# Patient Record
Sex: FEMALE | Race: BLACK OR AFRICAN AMERICAN | NOT HISPANIC OR LATINO | URBAN - METROPOLITAN AREA
[De-identification: names, ages, dates, MRNs, and addresses within clinical notes are randomized per-mention and may not be internally consistent; named-entity substitution may affect disease eponyms.]

---

## 2023-08-11 DIAGNOSIS — Z80.3 FAMILY HISTORY OF MALIGNANT NEOPLASM OF BREAST: ICD-10-CM

## 2023-08-11 DIAGNOSIS — N63.20 MASS OF LEFT BREAST, UNSPECIFIED QUADRANT: Primary | ICD-10-CM

## 2023-09-20 DIAGNOSIS — N63.20 MASS OF LEFT BREAST, UNSPECIFIED QUADRANT: Primary | ICD-10-CM

## 2023-09-20 PROBLEM — N60.12 DIFFUSE CYSTIC MASTOPATHY OF BOTH BREASTS: Status: ACTIVE | Noted: 2023-09-20

## 2023-09-20 PROBLEM — N60.11 DIFFUSE CYSTIC MASTOPATHY OF BOTH BREASTS: Status: ACTIVE | Noted: 2023-09-20

## 2023-09-20 PROBLEM — N63.10 MASS OF MULTIPLE SITES OF RIGHT BREAST: Status: ACTIVE | Noted: 2023-09-20

## 2023-09-20 NOTE — ASSESSMENT & PLAN NOTE
We discussed her family history and how it could impact her own future risks.  We discussed family vs. genetic history and the importance and implications of each. All questions answered to her satisfaction.  She knows that as additional family members are diagnosed - she will need to let us know as this may change follow up and imaging recommendations.    We had a discussion concerning Breast Cancer Risk Reduction and current NCCN Guidelines. She knows that her risk can be lowered slightly with a healthy lifestyle and minimal ETOH use. Being physically active will also help. She should reduce or stay away from OCPs and HRT as possible.

## 2023-09-20 NOTE — PROGRESS NOTES
[facility]  Shukri Wood MD, FACS  Raymundo Norman NP-C      Date of Service: 9/28/2023    Chief Complaint:   Belle Suarez is a 53 y.o. female presenting today for her 6 month evaluation. She is due for her annual mammogram.  She reports no interval changes.     History of Present Illness:   Mrs. Mccormack first presented on February 23, 2017 due to a left breast mass and sonocore biopsy revealed Fibroadenomatoid breast parenchyma with nodular adenosis. She has a worrisome family history of breast cancer. Her TAL was calculated in 2019 and found to give her a 26.9% Lifetime Risk of Breast Cancer. She was lost to follow up after her biopsy in 2017. She presented back September 30, 2019 to re-establish care. In June 2021 she was noted with a benign appearing complicated cysts in the right breast and close follow up proved stability. MD::: Radha Fine MD.      Past Medical History:   Diagnosis Date    Breast pain     Diffuse cystic mastopathy of both breasts 9/20/2023    Family history of malignant neoplasm of breast     Left breast lump     Mass of multiple sites of right breast 9/20/2023      Past Surgical History:   Procedure Laterality Date    HYSTERECTOMY      Left breast sonocore biopsy          Current Outpatient Medications:     cyclobenzaprine (FLEXERIL) 5 MG tablet, Take 1 tablet by mouth as needed., Disp: , Rfl:     diltiaZEM (CARDIZEM CD) 240 MG 24 hr capsule, Take 240 mg by mouth., Disp: , Rfl:     lamoTRIgine (LAMICTAL) 200 MG tablet, Take 200 mg by mouth., Disp: , Rfl:     losartan-hydrochlorothiazide 100-25 mg (HYZAAR) 100-25 mg per tablet, Take 1 tablet by mouth., Disp: , Rfl:     metFORMIN (GLUCOPHAGE-XR) 500 MG ER 24hr tablet, Take 500 mg by mouth., Disp: , Rfl:     nystatin (MYCOSTATIN) 100,000 unit/mL suspension, Take 5 mLs by mouth 4 (four) times daily., Disp: , Rfl:     ziprasidone (GEODON) 80 MG capsule, Take 80 mg by mouth., Disp: , Rfl:    Review of  patient's allergies indicates:   Allergen Reactions    Quinolones Other (See Comments)     Medication interations  Medication interations      Aspirin       Social History     Tobacco Use    Smoking status: Never    Smokeless tobacco: Never   Substance Use Topics    Alcohol use: Not on file      Family History   Problem Relation Age of Onset    Breast cancer Mother     Breast cancer Maternal Grandmother     Ovarian cancer Neg Hx         Review of Systems   Integumentary:  Negative for color change, rash, mole/lesion, breast mass, breast discharge and breast tenderness.   Breast: Negative for mass and tenderness       Physical Exam   HENT:   Head: Normocephalic.   Pulmonary/Chest: Right breast exhibits no inverted nipple, no mass, no nipple discharge, no skin change and no tenderness. Left breast exhibits no inverted nipple, no mass, no nipple discharge, no skin change and no tenderness. No breast swelling.   Genitourinary: No breast swelling.   Musculoskeletal: Lymphadenopathy:      Upper Body:      Right upper body: No supraclavicular or axillary adenopathy.      Left upper body: No supraclavicular or axillary adenopathy.     Neurological: She is alert.        MAMMOGRAM REPORT: FINDINGS: The patient has had a left-sided core biopsy. There are scattered areas of fibroglandular density. No suspicious mass, calcification, or architectural distortion of either breast to suggest malignancy. Benign calcifications are noted. Stable benign bilateral masses. IMPRESSION: No mammographic evidence of malignancy. No significant change when compared to prior exam. Annual mammogram in 1 year is recommended.    ASSESSMENT and PLAN OF CARE     1. Mass of lower outer quadrant of left breast  Assessment & Plan:  We reviewed our findings today and her questions were answered.  She understands that her imaging and exams have remained stable (and show nothing concerning).  She is comfortable being followed in a conservative fashion.       She understands the importance of monthly self-breast examination and knows to report any and all changes as they occur.        2. Mass of multiple sites of right breast  Assessment & Plan:  Same as above      3. Diffuse cystic mastopathy of both breasts  Assessment & Plan:  We discussed our Fibrocystic Mastopathy Protocol in detail. She should take Vitamin E 800 IU everyday x 3 months or until non-tender then can stop Vitamin E vs. continue daily at 400 IU.  The use of ice packs or warms soaks to tender area of the breast may also be of some benefit.  If warm soaks help her tenderness - She can use Aspercreme (unless allergic to Aspirin) on the affected area.  Ibuprofen (if no contraindications) at 800 mg three times per day for 5 days can also relieve many symptoms associated with swollen or inflamed tissue.  She can repeat Ibuprofen for 5 days, but then should be off for 5 days as it may cause gastric upset.  It is a good idea to wear a tight bra during the day and night to minimize movement of the tender area (Sports Bras work well).  Evening Primrose Oil can be bought over the counter and used at a dose of 3000 mg per day to help with any breast pain/tenderness not improved by implementing the above measures.        4. Family history of malignant neoplasm of breast  Assessment & Plan:  We discussed her family history and how it could impact her own future risks.  We discussed family vs. genetic history and the importance and implications of each. All questions answered to her satisfaction.  She knows that as additional family members are diagnosed - she will need to let us know as this may change follow up and imaging recommendations.    We had a discussion concerning Breast Cancer Risk Reduction and current NCCN Guidelines. She knows that her risk can be lowered slightly with a healthy lifestyle and minimal ETOH use. Being physically active will also help. She should reduce or stay away from OCPs and HRT  as possible.           5. Breast pain  Assessment & Plan:  We discussed our fibrocystic mastopathy protocol in detail. She knows that if she follows this protocol - that her symptoms should improve.  We discussed how breast pain is usually not associated with breast cancer, however, pain can be the presenting symptom with some cancers (but this could be coincidental). Still, if her pain does not improve in 8-12 weeks she should call us back for additional recommendations.        Medical Decision Making: It is my impression that this patient suffers all conditions contained in this medical document.  Each of these conditions did affect our plan of care and my medical decision making today.  It is my opinion that the medical decision making concerning this patient was of minimal  difficulty based on the aforementioned conditions.  Any further recommendations will be communicated to the patient by me.  I have reviewed and verified her allergies, list of medications, medical and surgical histories, social history, and a pertinent review of symptoms.      Follow up:  6 months and prn    For:  MRI vs Ultrasound

## 2023-09-28 ENCOUNTER — OFFICE VISIT (OUTPATIENT)
Dept: SURGERY | Facility: CLINIC | Age: 53
End: 2023-09-28
Payer: COMMERCIAL

## 2023-09-28 DIAGNOSIS — N60.12 DIFFUSE CYSTIC MASTOPATHY OF BOTH BREASTS: ICD-10-CM

## 2023-09-28 DIAGNOSIS — N60.11 DIFFUSE CYSTIC MASTOPATHY OF BOTH BREASTS: ICD-10-CM

## 2023-09-28 DIAGNOSIS — N64.4 BREAST PAIN: ICD-10-CM

## 2023-09-28 DIAGNOSIS — Z80.3 FAMILY HISTORY OF MALIGNANT NEOPLASM OF BREAST: ICD-10-CM

## 2023-09-28 DIAGNOSIS — N63.10 MASS OF MULTIPLE SITES OF RIGHT BREAST: ICD-10-CM

## 2023-09-28 DIAGNOSIS — N63.23 MASS OF LOWER OUTER QUADRANT OF LEFT BREAST: Primary | ICD-10-CM

## 2023-09-28 PROCEDURE — 1159F PR MEDICATION LIST DOCUMENTED IN MEDICAL RECORD: ICD-10-PCS | Mod: CPTII,S$GLB,, | Performed by: NURSE PRACTITIONER

## 2023-09-28 PROCEDURE — 99213 OFFICE O/P EST LOW 20 MIN: CPT | Mod: S$GLB,,, | Performed by: NURSE PRACTITIONER

## 2023-09-28 PROCEDURE — 99213 PR OFFICE/OUTPT VISIT, EST, LEVL III, 20-29 MIN: ICD-10-PCS | Mod: S$GLB,,, | Performed by: NURSE PRACTITIONER

## 2023-09-28 PROCEDURE — 1160F RVW MEDS BY RX/DR IN RCRD: CPT | Mod: CPTII,S$GLB,, | Performed by: NURSE PRACTITIONER

## 2023-09-28 PROCEDURE — 1159F MED LIST DOCD IN RCRD: CPT | Mod: CPTII,S$GLB,, | Performed by: NURSE PRACTITIONER

## 2023-09-28 PROCEDURE — 99999 PR PBB SHADOW E&M-EST. PATIENT-LVL III: ICD-10-PCS | Mod: PBBFAC,,, | Performed by: NURSE PRACTITIONER

## 2023-09-28 PROCEDURE — 1160F PR REVIEW ALL MEDS BY PRESCRIBER/CLIN PHARMACIST DOCUMENTED: ICD-10-PCS | Mod: CPTII,S$GLB,, | Performed by: NURSE PRACTITIONER

## 2023-09-28 PROCEDURE — 99999 PR PBB SHADOW E&M-EST. PATIENT-LVL III: CPT | Mod: PBBFAC,,, | Performed by: NURSE PRACTITIONER

## 2023-09-28 RX ORDER — LAMOTRIGINE 200 MG/1
200 TABLET ORAL
COMMUNITY
Start: 2023-07-12

## 2023-09-28 RX ORDER — DILTIAZEM HYDROCHLORIDE 240 MG/1
240 CAPSULE, COATED, EXTENDED RELEASE ORAL
COMMUNITY
Start: 2023-09-24

## 2023-09-28 RX ORDER — ZIPRASIDONE HYDROCHLORIDE 80 MG/1
80 CAPSULE ORAL
COMMUNITY
Start: 2023-02-02

## 2023-09-28 RX ORDER — METFORMIN HYDROCHLORIDE 500 MG/1
500 TABLET, EXTENDED RELEASE ORAL
COMMUNITY
Start: 2023-02-02

## 2023-09-28 RX ORDER — LOSARTAN POTASSIUM AND HYDROCHLOROTHIAZIDE 25; 100 MG/1; MG/1
1 TABLET ORAL
COMMUNITY
Start: 2023-05-06

## 2023-09-28 RX ORDER — NYSTATIN 100000 [USP'U]/ML
5 SUSPENSION ORAL 4 TIMES DAILY
COMMUNITY
Start: 2023-08-22

## 2023-09-28 RX ORDER — CYCLOBENZAPRINE HCL 5 MG
1 TABLET ORAL
COMMUNITY
Start: 2023-03-14

## 2023-10-30 ENCOUNTER — PATIENT MESSAGE (OUTPATIENT)
Dept: SURGERY | Facility: CLINIC | Age: 53
End: 2023-10-30
Payer: COMMERCIAL

## 2023-10-30 ENCOUNTER — TELEPHONE (OUTPATIENT)
Dept: SURGERY | Facility: CLINIC | Age: 53
End: 2023-10-30
Payer: COMMERCIAL

## 2023-10-30 NOTE — TELEPHONE ENCOUNTER
I spoke to  Belle and I apologized for her experience with our office. I let her know that Jhoana the supervisor will be reaching out to her soon to address her concerns. I also gave her the number to billing to discuss her dispute. She accepted my apology.

## 2024-03-18 ENCOUNTER — PATIENT MESSAGE (OUTPATIENT)
Dept: SURGERY | Facility: CLINIC | Age: 54
End: 2024-03-18
Payer: COMMERCIAL

## 2024-03-22 ENCOUNTER — TELEPHONE (OUTPATIENT)
Dept: SURGERY | Facility: CLINIC | Age: 54
End: 2024-03-22
Payer: COMMERCIAL